# Patient Record
Sex: FEMALE
[De-identification: names, ages, dates, MRNs, and addresses within clinical notes are randomized per-mention and may not be internally consistent; named-entity substitution may affect disease eponyms.]

---

## 2023-03-03 ENCOUNTER — NURSE TRIAGE (OUTPATIENT)
Dept: OTHER | Facility: CLINIC | Age: 63
End: 2023-03-03

## 2023-03-03 NOTE — TELEPHONE ENCOUNTER
Location of patient: [facility]    Received call from Leonid at [facility] with Red Flag Complaint. Venkat Hernandez MRN: [id number]    Provider: Art Muller MD    Subjective: Caller states \"Cough\"     Current Symptoms:   Dry cough x \"couple weeks\"; worsening  Minimal sputum production of unknown coloring   Chest tightness, \"little twinges\"  Stiff back  Denies cold sx, difficulty breathing, wheezing    At home Covid test negative last night. Pain Severity: 0/10; intermittent    Temperature: Denies    What has been tried: Nothing    Recommended disposition: ED/UCC now    Care advice provided, patient verbalizes understanding; denies any other questions or concerns.     This triage is a result of a call to the [facility] 258      Reason for Disposition   Chest pain present when not coughing    Protocols used: Cough-ADULT-[place]